# Patient Record
Sex: FEMALE | Race: BLACK OR AFRICAN AMERICAN | NOT HISPANIC OR LATINO | Employment: FULL TIME | URBAN - METROPOLITAN AREA
[De-identification: names, ages, dates, MRNs, and addresses within clinical notes are randomized per-mention and may not be internally consistent; named-entity substitution may affect disease eponyms.]

---

## 2017-02-06 ENCOUNTER — TELEPHONE (OUTPATIENT)
Dept: OBSTETRICS AND GYNECOLOGY | Facility: HOSPITAL | Age: 27
End: 2017-02-06

## 2017-02-06 NOTE — TELEPHONE ENCOUNTER
Patient has not seen me since 11/22/16 and has no showed her last 3 visits.  She has been seeing maternal fetal medicine.  I spoke with maternal-fetal medicine today and they intend for her to continue seeing us as well.  Please call the patient and have her schedule another appointment with us and stress that she needs to see both offices.

## 2017-02-14 ENCOUNTER — ROUTINE PRENATAL (OUTPATIENT)
Dept: OBSTETRICS AND GYNECOLOGY | Facility: CLINIC | Age: 27
End: 2017-02-14

## 2017-02-14 VITALS — BODY MASS INDEX: 38.92 KG/M2 | SYSTOLIC BLOOD PRESSURE: 120 MMHG | DIASTOLIC BLOOD PRESSURE: 77 MMHG | WEIGHT: 256 LBS

## 2017-02-14 DIAGNOSIS — O99.612 CONSTIPATION DURING PREGNANCY IN SECOND TRIMESTER: ICD-10-CM

## 2017-02-14 DIAGNOSIS — O09.299 HIGH RISK PREGNANCY DUE TO PREVIOUS ABORTIONS: ICD-10-CM

## 2017-02-14 DIAGNOSIS — O10.919 CHRONIC PRE-EXISTING HYPERTENSION DURING PREGNANCY: Primary | ICD-10-CM

## 2017-02-14 DIAGNOSIS — K59.00 CONSTIPATION DURING PREGNANCY IN SECOND TRIMESTER: ICD-10-CM

## 2017-02-14 DIAGNOSIS — Z13.1 SCREENING FOR DIABETES MELLITUS: ICD-10-CM

## 2017-02-14 DIAGNOSIS — Z91.199 PREGNANCY COMPLICATED BY NONCOMPLIANCE IN SECOND TRIMESTER, ANTEPARTUM: ICD-10-CM

## 2017-02-14 DIAGNOSIS — O09.892 PREGNANCY COMPLICATED BY NONCOMPLIANCE IN SECOND TRIMESTER, ANTEPARTUM: ICD-10-CM

## 2017-02-14 DIAGNOSIS — O09.292 HISTORY OF INCOMPETENT CERVIX, CURRENTLY PREGNANT IN SECOND TRIMESTER: ICD-10-CM

## 2017-02-14 PROCEDURE — 99213 OFFICE O/P EST LOW 20 MIN: CPT | Performed by: OBSTETRICS & GYNECOLOGY

## 2017-02-14 RX ORDER — ASPIRIN 81 MG
TABLET,CHEWABLE ORAL
Refills: 3 | COMMUNITY
Start: 2016-12-20 | End: 2017-08-01

## 2017-02-14 RX ORDER — DOCUSATE SODIUM 100 MG/1
100 CAPSULE, LIQUID FILLED ORAL 2 TIMES DAILY PRN
Qty: 60 CAPSULE | Refills: 5 | Status: SHIPPED | OUTPATIENT
Start: 2017-02-14 | End: 2017-05-03

## 2017-02-14 RX ORDER — SWAB
SWAB, NON-MEDICATED MISCELLANEOUS
Refills: 5 | COMMUNITY
Start: 2017-01-20 | End: 2017-08-01

## 2017-02-14 NOTE — PROGRESS NOTES
Chief complaint: Pregnancy, constipation  History present illness: Patient is here for pregnancy follow-up.  She has not seen me in about 12 weeks.  Patient has been seen maternal fetal medicine and this time.  She in the interim since her last visit has had a cerclage performed.  She is not having any problems since then.  She denies vaginal bleeding or leakage of fluid.  She does note some minor low back pain at the site of the epidural.  Patient also reports problems with constipation.  She reports that her stool is very hard and difficult to pass.  She knows it is important not to strain.  She is not taking any medication to help with constipation.  The patient is scheduled to see maternal fetal medicine later today for follow-up.  Objective: See flow sheet above  Musculoskeletal exam: Spine soft, nontender, no masses at the epidural site.  No signs of infection at the epidural site.  Assessment:  1.  26-year-old  4 para 0 at 22 and one sevenths weeks gestational age  2.  History of incompetent cervix with rescue cerclage this pregnancy  3.  Chronic hypertension during pregnancy, on medication, blood pressures well controlled  4.  Constipation  5.  Noncompliance with visits  Plan:  1.  I stressed the importance of the patient keep an off her appointments with both maternal fetal medicine and our office.  I explained that she will need to continue to see both.  The patient has some concerns about delivery at [facility].  Patient reports that she would prefer to deliver at [facility].  The patient has had multiple interactions at [facility] given her recent cerclage and her maternal fetal medicine visits.  She reports that she feels very comfortable there.  For the aspects of continuity of care, I feel it is reasonable for the patient to deliver at [facility].  She agrees with the plan.  2.  The patient will need a glucose tolerance test at her next visit.  The patient  reports that she has completed her 24-hour urine collection at the maternal-fetal medicine office.  3.  We discussed dietary modifications to help with constipation.  Increase fiber and by mouth hydration.  We will also start Colace and the patient is encouraged to take over-the-counter fiber supplements.  4.  On exam, there are no worrisome findings of the patient's epidural site.  Reassurance is offered.  Return to my office in 4 weeks.  I spent 12 out of 15 minutes with the patient in face to face counseling of the above issues.

## 2017-03-14 ENCOUNTER — RESULTS ENCOUNTER (OUTPATIENT)
Dept: OBSTETRICS AND GYNECOLOGY | Facility: CLINIC | Age: 27
End: 2017-03-14

## 2017-03-14 DIAGNOSIS — O09.299 HIGH RISK PREGNANCY DUE TO PREVIOUS ABORTIONS: ICD-10-CM

## 2017-03-14 DIAGNOSIS — Z13.1 SCREENING FOR DIABETES MELLITUS: ICD-10-CM

## 2017-03-17 LAB
EXTERNAL GTT 1 HOUR: 119
GLUCOSE 1H P 50 G GLC PO SERPL-MCNC: 119 MG/DL (ref 65–139)

## 2017-03-17 RX ORDER — PROMETHAZINE HYDROCHLORIDE 12.5 MG/1
12.5 TABLET ORAL EVERY 6 HOURS PRN
Qty: 10 TABLET | Refills: 0 | Status: SHIPPED | OUTPATIENT
Start: 2017-03-17 | End: 2017-04-03

## 2017-03-27 ENCOUNTER — ROUTINE PRENATAL (OUTPATIENT)
Dept: OBSTETRICS AND GYNECOLOGY | Facility: CLINIC | Age: 27
End: 2017-03-27

## 2017-03-27 VITALS — DIASTOLIC BLOOD PRESSURE: 88 MMHG | SYSTOLIC BLOOD PRESSURE: 130 MMHG | BODY MASS INDEX: 39.99 KG/M2 | WEIGHT: 263 LBS

## 2017-03-27 DIAGNOSIS — O09.292 HISTORY OF INCOMPETENT CERVIX, CURRENTLY PREGNANT IN SECOND TRIMESTER: ICD-10-CM

## 2017-03-27 DIAGNOSIS — O10.919 CHRONIC PRE-EXISTING HYPERTENSION DURING PREGNANCY: Primary | ICD-10-CM

## 2017-03-27 NOTE — PROGRESS NOTES
I had to leave the office for an emergency in the hospital, prior to being able to see the patient for her visit today.  We have rescheduled her appointment for tomorrow.

## 2017-03-30 ENCOUNTER — TELEPHONE (OUTPATIENT)
Dept: OBSTETRICS AND GYNECOLOGY | Facility: CLINIC | Age: 27
End: 2017-03-30

## 2017-03-30 ENCOUNTER — DOCUMENTATION (OUTPATIENT)
Dept: OBSTETRICS AND GYNECOLOGY | Facility: CLINIC | Age: 27
End: 2017-03-30

## 2017-03-30 NOTE — TELEPHONE ENCOUNTER
The patient had an appointment with me the day after I was called out, when I had no other responsibilities other than seeing patients in the office.  She chose not to come to the office that day.  If she is concerned about how how risk she is, she should've kept her appointment that day.  She has an appointment to see me next week on Monday.  I will make every attempt to be at that appointment, and she should plan to be there as well if she wishes to continue care in our office.

## 2017-03-30 NOTE — TELEPHONE ENCOUNTER
That is fine.  The patient has been frequently noncompliant with her visits with me.  It appears now she wishes to see another provider.  I cannot guarantee that maternal-fetal medicine will want to see the patient solely without a general provider.  In such case, the patient will need to make arrangements for another general OB/GYN to take care of her.  Please send the patient a termination of care letter.  We will continue to see her for emergencies for the next 30 days.  After which point time, she will need to see another OB provider.

## 2017-03-30 NOTE — PROGRESS NOTES
Patient has canceled or no showed multiple appointments during the course of this pregnancy.  She presented for her appointment on Monday 3/27/17.  Prior to me being able to see the patient, I was called out of the clinic to attend an emergency in the hospital.  The patient was given another appointment for the following day, Tuesday 3/28/17.  The patient did not show up for that appointment.  Patient is: The office today demanding to be seen immediately.  We have no appointments available today or tomorrow.  The patient was offered an appointment on Monday or Tuesday next week, but the patient initially does not want to have that appointment because I will be on-call and possibly may get called out of the office again.  If the patient is not willing to come into the office on Monday or Tuesday of next week, she will be need to be rescheduled for the following week when we have appointments available.  The patient has scheduled another appointment for Monday for/3/17.

## 2017-03-30 NOTE — TELEPHONE ENCOUNTER
I spoke to her and relayed the message.   She said she does not want to release her care she has a problem with not being able to be seen due to the Dr being called out for a delivery, going on to say that she is high risk. I reviewed her visits and it looks like she has had several no-shows and cancels and if her care was a concern, she has many of these that are unexplained. Also, if her care is important to her, she should wait for the doctor to come back from the delivery so she can have her appointment.   Are you willing to see her sometime next week when you are not on call?   She doesn't seem to think we are concerned for her and I explained to her that we cannot control if the provider is called out.  Thanks  Ankita

## 2017-03-30 NOTE — TELEPHONE ENCOUNTER
Dr. Avendaño,    Pt called asking if you will release her from your care and allow her to just see MFM only. Pt @ [phone].    Thanks,  Ankita    I leave at 4 today and will not be back in the office until 04/04/2017.

## 2017-04-03 ENCOUNTER — ROUTINE PRENATAL (OUTPATIENT)
Dept: OBSTETRICS AND GYNECOLOGY | Facility: CLINIC | Age: 27
End: 2017-04-03

## 2017-04-03 VITALS — DIASTOLIC BLOOD PRESSURE: 84 MMHG | BODY MASS INDEX: 39.84 KG/M2 | SYSTOLIC BLOOD PRESSURE: 128 MMHG | WEIGHT: 262 LBS

## 2017-04-03 DIAGNOSIS — O09.293 HISTORY OF INCOMPETENT CERVIX, CURRENTLY PREGNANT IN THIRD TRIMESTER: Primary | ICD-10-CM

## 2017-04-03 DIAGNOSIS — O10.919 CHRONIC PRE-EXISTING HYPERTENSION DURING PREGNANCY: ICD-10-CM

## 2017-04-03 PROCEDURE — 99213 OFFICE O/P EST LOW 20 MIN: CPT | Performed by: OBSTETRICS & GYNECOLOGY

## 2017-04-03 NOTE — PROGRESS NOTES
Chief complaint: Pregnancy  History present illness: Patient is without major complaints today.  She reports good fetal movement currently.  She reports that sometimes the baby is not is active during the day.  She wants the baby is always active at night.  She reports that she was having some Avinash Joseph contractions about 1-2 weeks ago, but these have resolved.  She denies vaginal bleeding.  Objective: See flow sheet above  Gen.: No acute distress, awake 3  Abdomen: Soft, no fundal tenderness, fundal height 30 cm, fetal heart tones 155  Extremities: No calf tenderness, no lower extremity edema  Labs: One-hour glucose tolerance test 119  Assessment:  1.  27-year-old  4 para 0 at 29-0/7 weeks gestational age  2.  History of cervical insufficiency, cerclage in place, stable  3.  Chronic hypertension, blood pressures stable on labetalol  4.  Obesity  Plan:  1.  The patient has been released by maternal fetal medicine.  They report that routine cervical length ultrasounds are not necessary.  They recommended removal of the cerclage at 35 weeks' gestation.  The ultrasound report reveals no anatomic abnormalities.  2.  We will have the patient return in 2 weeks.  We'll start  testing in 2 weeks secondary to chronic hypertension.  Continue oral labetalol for now.  Estimated fetal weight in 2 weeks at the time of the ultrasound.  3.  We discussed fetal movement assessments.  4.  We discussed signs of  labor.  All the patient's questions are answered.  5.  Return to the office in 2 weeks.  I spent 12 out of 15 minutes with the patient in face to face counseling of the above issues.

## 2017-04-11 ENCOUNTER — PROCEDURE VISIT (OUTPATIENT)
Dept: OBSTETRICS AND GYNECOLOGY | Facility: CLINIC | Age: 27
End: 2017-04-11

## 2017-04-11 ENCOUNTER — ROUTINE PRENATAL (OUTPATIENT)
Dept: OBSTETRICS AND GYNECOLOGY | Facility: CLINIC | Age: 27
End: 2017-04-11

## 2017-04-11 VITALS — BODY MASS INDEX: 40.6 KG/M2 | SYSTOLIC BLOOD PRESSURE: 139 MMHG | WEIGHT: 267 LBS | DIASTOLIC BLOOD PRESSURE: 87 MMHG

## 2017-04-11 DIAGNOSIS — O09.299 HIGH RISK PREGNANCY DUE TO PREVIOUS ABORTIONS: Primary | ICD-10-CM

## 2017-04-11 DIAGNOSIS — O10.919 CHRONIC PRE-EXISTING HYPERTENSION DURING PREGNANCY: ICD-10-CM

## 2017-04-11 DIAGNOSIS — O10.919 HTN IN PREGNANCY, CHRONIC: Primary | ICD-10-CM

## 2017-04-11 DIAGNOSIS — O09.293 HISTORY OF INCOMPETENT CERVIX, CURRENTLY PREGNANT IN THIRD TRIMESTER: ICD-10-CM

## 2017-04-11 PROCEDURE — 76819 FETAL BIOPHYS PROFIL W/O NST: CPT | Performed by: OBSTETRICS & GYNECOLOGY

## 2017-04-11 PROCEDURE — 76816 OB US FOLLOW-UP PER FETUS: CPT | Performed by: OBSTETRICS & GYNECOLOGY

## 2017-04-11 PROCEDURE — 99213 OFFICE O/P EST LOW 20 MIN: CPT | Performed by: OBSTETRICS & GYNECOLOGY

## 2017-04-11 NOTE — PROGRESS NOTES
Chief complaint: Pregnancy  History present illness: Patient with no major complaints today.  Here for routine visit.  Good fetal movement.  No vaginal bleeding or contractions.  Objective: See flow sheet above  Gen.: No acute distress, awake and oriented ×3  Abdomen: Soft, nontender, fetal heart tones are 161  Extremities: No lower extremity edema  Ultrasound today: Estimated fetal weight 3 lbs. 5 oz. or the 48th percentile.  Amniotic fluid index 14 cm.  Cephalic.  Assessment:  1.  27-year-old  4 para 0 at 30 and one sevenths weeks gestational age  2.  History of incompetent cervix, cerclage in place  3.  Chronic hypertension, stable on labetalol    Plan:  1.  Ultrasound findings discussed with the patient today.  Growth is appropriate.  We'll start  testing at 32 weeks secondary to chronic hypertension.  She'll see one of my partners in 2 weeks.  I will see her back in 4 weeks.  2.  Discussed plans for delivery at [facility], by patient preference  I spent 12 out of 15 minutes with the patient in face to face counseling of the above issues.

## 2017-04-26 ENCOUNTER — PROCEDURE VISIT (OUTPATIENT)
Dept: OBSTETRICS AND GYNECOLOGY | Facility: CLINIC | Age: 27
End: 2017-04-26

## 2017-04-26 ENCOUNTER — ROUTINE PRENATAL (OUTPATIENT)
Dept: OBSTETRICS AND GYNECOLOGY | Facility: CLINIC | Age: 27
End: 2017-04-26

## 2017-04-26 VITALS — SYSTOLIC BLOOD PRESSURE: 124 MMHG | DIASTOLIC BLOOD PRESSURE: 81 MMHG | WEIGHT: 269 LBS | BODY MASS INDEX: 40.9 KG/M2

## 2017-04-26 DIAGNOSIS — O09.293 HISTORY OF INCOMPETENT CERVIX, CURRENTLY PREGNANT IN THIRD TRIMESTER: ICD-10-CM

## 2017-04-26 DIAGNOSIS — O10.919 HTN IN PREGNANCY, CHRONIC: Primary | ICD-10-CM

## 2017-04-26 DIAGNOSIS — Z3A.32 32 WEEKS GESTATION OF PREGNANCY: Primary | ICD-10-CM

## 2017-04-26 DIAGNOSIS — O10.919 HTN IN PREGNANCY, CHRONIC: ICD-10-CM

## 2017-04-26 PROCEDURE — 99214 OFFICE O/P EST MOD 30 MIN: CPT | Performed by: OBSTETRICS & GYNECOLOGY

## 2017-04-26 PROCEDURE — 76819 FETAL BIOPHYS PROFIL W/O NST: CPT | Performed by: OBSTETRICS & GYNECOLOGY

## 2017-04-26 NOTE — PROGRESS NOTES
"Cc:  Pregnancy follow up  Multiple issues today.  Patient removed breast piercings early in pregnancy.  Now, patient states that the left nipple has discharge.  Right nipple with leakage.  Nipple is tender.  Further, patient also states she lost her \"mucous plug\".  Had some blood tinged bleeding/spotting.  Good FM.  No cramping or contractions.  Vitals reviewed by me.  Gen - alert and pleasant.  Abdomen - gravid, nontender, no guarding or rebound.  Breasts - nipples normal to palpation.  Cervix - closed.  Cerclage in place.  No vaginal bleeding.  BPP reviewed and score 8/8.  A/P:  IUP at 32 weeks with chronic HTN, history of cervical incompetence.  - Nipple discharge -- reassurance given.  Keep areola/nipples clean.  - cHTN -- continue labetalol and weekly BPP.  No evidence of PIH.  - Cervical incompetence - cerclage in place.  PTL warnings.  "

## 2017-05-03 ENCOUNTER — ROUTINE PRENATAL (OUTPATIENT)
Dept: OBSTETRICS AND GYNECOLOGY | Facility: CLINIC | Age: 27
End: 2017-05-03

## 2017-05-03 ENCOUNTER — PROCEDURE VISIT (OUTPATIENT)
Dept: OBSTETRICS AND GYNECOLOGY | Facility: CLINIC | Age: 27
End: 2017-05-03

## 2017-05-03 VITALS — WEIGHT: 271 LBS | DIASTOLIC BLOOD PRESSURE: 82 MMHG | SYSTOLIC BLOOD PRESSURE: 132 MMHG | BODY MASS INDEX: 41.21 KG/M2

## 2017-05-03 DIAGNOSIS — O10.919 HTN IN PREGNANCY, CHRONIC: Primary | ICD-10-CM

## 2017-05-03 DIAGNOSIS — O09.293 HISTORY OF INCOMPETENT CERVIX, CURRENTLY PREGNANT IN THIRD TRIMESTER: ICD-10-CM

## 2017-05-03 DIAGNOSIS — O10.919 CHRONIC PRE-EXISTING HYPERTENSION DURING PREGNANCY: Primary | ICD-10-CM

## 2017-05-03 PROCEDURE — 76819 FETAL BIOPHYS PROFIL W/O NST: CPT | Performed by: OBSTETRICS & GYNECOLOGY

## 2017-05-03 PROCEDURE — 99213 OFFICE O/P EST LOW 20 MIN: CPT | Performed by: OBSTETRICS & GYNECOLOGY

## 2017-05-04 ENCOUNTER — TELEPHONE (OUTPATIENT)
Dept: OBSTETRICS AND GYNECOLOGY | Facility: CLINIC | Age: 27
End: 2017-05-04

## 2017-05-10 ENCOUNTER — PROCEDURE VISIT (OUTPATIENT)
Dept: OBSTETRICS AND GYNECOLOGY | Facility: CLINIC | Age: 27
End: 2017-05-10

## 2017-05-10 DIAGNOSIS — O10.919 CHRONIC PRE-EXISTING HYPERTENSION DURING PREGNANCY: ICD-10-CM

## 2017-05-10 DIAGNOSIS — O10.919 HTN IN PREGNANCY, CHRONIC: Primary | ICD-10-CM

## 2017-05-10 PROCEDURE — 76816 OB US FOLLOW-UP PER FETUS: CPT | Performed by: OBSTETRICS & GYNECOLOGY

## 2017-05-10 PROCEDURE — 76819 FETAL BIOPHYS PROFIL W/O NST: CPT | Performed by: OBSTETRICS & GYNECOLOGY

## 2017-05-17 ENCOUNTER — ROUTINE PRENATAL (OUTPATIENT)
Dept: OBSTETRICS AND GYNECOLOGY | Facility: CLINIC | Age: 27
End: 2017-05-17

## 2017-05-17 ENCOUNTER — PROCEDURE VISIT (OUTPATIENT)
Dept: OBSTETRICS AND GYNECOLOGY | Facility: CLINIC | Age: 27
End: 2017-05-17

## 2017-05-17 VITALS — DIASTOLIC BLOOD PRESSURE: 88 MMHG | WEIGHT: 272 LBS | BODY MASS INDEX: 41.36 KG/M2 | SYSTOLIC BLOOD PRESSURE: 128 MMHG

## 2017-05-17 DIAGNOSIS — O09.299 HIGH RISK PREGNANCY DUE TO PREVIOUS ABORTIONS: ICD-10-CM

## 2017-05-17 DIAGNOSIS — O10.919 HTN IN PREGNANCY, CHRONIC: Primary | ICD-10-CM

## 2017-05-17 DIAGNOSIS — O10.919 CHRONIC PRE-EXISTING HYPERTENSION DURING PREGNANCY: ICD-10-CM

## 2017-05-17 DIAGNOSIS — O09.293 HISTORY OF INCOMPETENT CERVIX, CURRENTLY PREGNANT IN THIRD TRIMESTER: Primary | ICD-10-CM

## 2017-05-17 LAB — EXTERNAL GROUP B STREP ANTIGEN: NEGATIVE

## 2017-05-17 PROCEDURE — 76819 FETAL BIOPHYS PROFIL W/O NST: CPT | Performed by: OBSTETRICS & GYNECOLOGY

## 2017-05-17 PROCEDURE — 99214 OFFICE O/P EST MOD 30 MIN: CPT | Performed by: OBSTETRICS & GYNECOLOGY

## 2017-05-21 LAB — B-HEM STREP SPEC QL CULT: NEGATIVE

## 2017-05-24 ENCOUNTER — ROUTINE PRENATAL (OUTPATIENT)
Dept: OBSTETRICS AND GYNECOLOGY | Facility: CLINIC | Age: 27
End: 2017-05-24

## 2017-05-24 ENCOUNTER — PROCEDURE VISIT (OUTPATIENT)
Dept: OBSTETRICS AND GYNECOLOGY | Facility: CLINIC | Age: 27
End: 2017-05-24

## 2017-05-24 VITALS — SYSTOLIC BLOOD PRESSURE: 133 MMHG | WEIGHT: 276 LBS | DIASTOLIC BLOOD PRESSURE: 84 MMHG | BODY MASS INDEX: 41.97 KG/M2

## 2017-05-24 DIAGNOSIS — O09.293 HISTORY OF INCOMPETENT CERVIX, CURRENTLY PREGNANT IN THIRD TRIMESTER: ICD-10-CM

## 2017-05-24 DIAGNOSIS — O10.919 CHRONIC PRE-EXISTING HYPERTENSION DURING PREGNANCY: ICD-10-CM

## 2017-05-24 DIAGNOSIS — O09.299 HIGH RISK PREGNANCY DUE TO PREVIOUS ABORTIONS: ICD-10-CM

## 2017-05-24 DIAGNOSIS — O10.919 HTN IN PREGNANCY, CHRONIC: Primary | ICD-10-CM

## 2017-05-24 DIAGNOSIS — O10.919 CHRONIC PRE-EXISTING HYPERTENSION DURING PREGNANCY: Primary | ICD-10-CM

## 2017-05-24 PROBLEM — Z13.1 SCREENING FOR DIABETES MELLITUS: Status: RESOLVED | Noted: 2017-02-14 | Resolved: 2017-05-24

## 2017-05-24 PROCEDURE — 99214 OFFICE O/P EST MOD 30 MIN: CPT | Performed by: OBSTETRICS & GYNECOLOGY

## 2017-05-24 PROCEDURE — 76819 FETAL BIOPHYS PROFIL W/O NST: CPT | Performed by: OBSTETRICS & GYNECOLOGY

## 2017-06-02 ENCOUNTER — PROCEDURE VISIT (OUTPATIENT)
Dept: OBSTETRICS AND GYNECOLOGY | Facility: CLINIC | Age: 27
End: 2017-06-02

## 2017-06-02 ENCOUNTER — ROUTINE PRENATAL (OUTPATIENT)
Dept: OBSTETRICS AND GYNECOLOGY | Facility: CLINIC | Age: 27
End: 2017-06-02

## 2017-06-02 VITALS — BODY MASS INDEX: 42.57 KG/M2 | WEIGHT: 280 LBS | SYSTOLIC BLOOD PRESSURE: 149 MMHG | DIASTOLIC BLOOD PRESSURE: 105 MMHG

## 2017-06-02 DIAGNOSIS — O10.919 CHRONIC PRE-EXISTING HYPERTENSION DURING PREGNANCY: Primary | ICD-10-CM

## 2017-06-02 DIAGNOSIS — O09.299 HIGH RISK PREGNANCY DUE TO PREVIOUS ABORTIONS: ICD-10-CM

## 2017-06-02 DIAGNOSIS — O10.919 HTN IN PREGNANCY, CHRONIC: Primary | ICD-10-CM

## 2017-06-02 PROCEDURE — 99213 OFFICE O/P EST LOW 20 MIN: CPT | Performed by: OBSTETRICS & GYNECOLOGY

## 2017-06-02 PROCEDURE — 76819 FETAL BIOPHYS PROFIL W/O NST: CPT | Performed by: OBSTETRICS & GYNECOLOGY

## 2017-06-02 NOTE — PROGRESS NOTES
Ob follow up    Ainsley Rojas is a 27 y.o.  37w4d patient being seen today for her obstetrical visit. Patient reports no complaints. Fetal movement: normal.      ROS - Denies leaking fluid, vaginal bleeding and notes good fetal movement.     BP (!) 149/105  Wt 280 lb (127 kg)  LMP 2016 (Exact Date)  BMI 42.57 kg/m2    FHT:  156BPM    Uterine Size: size equals dates   Presentations: cephalic   Pelvic Exam:     Dilation: 2cm    Effacement: 50%    Station:  -2                 Assessment    1) Pregnancy at 37w4d  2) Fetal status reassuring   3) GBS status - negative  4) Chronic hypertension   On labetalol 200 mg BID, dose today came back up this morning  Checks BP at home - Parameters reviewed. Repeat morning dose and check BP 2-3 hours later, to triage if not better  Scheduled IND for Wednesday  BPP today  with normal JOSE CARLOS     Plan    Labor warnings   [facility] BID        Gildardo Mayr MD   2017  10:16 AM

## 2017-06-07 ENCOUNTER — OUTSIDE FACILITY SERVICE (OUTPATIENT)
Dept: OBSTETRICS AND GYNECOLOGY | Facility: CLINIC | Age: 27
End: 2017-06-07

## 2017-06-09 ENCOUNTER — OUTSIDE FACILITY SERVICE (OUTPATIENT)
Dept: OBSTETRICS AND GYNECOLOGY | Facility: CLINIC | Age: 27
End: 2017-06-09

## 2017-06-13 ENCOUNTER — OUTSIDE FACILITY SERVICE (OUTPATIENT)
Dept: OBSTETRICS AND GYNECOLOGY | Facility: CLINIC | Age: 27
End: 2017-06-13

## 2017-06-23 ENCOUNTER — TELEPHONE (OUTPATIENT)
Dept: OBSTETRICS AND GYNECOLOGY | Facility: CLINIC | Age: 27
End: 2017-06-23

## 2017-06-23 ENCOUNTER — OUTSIDE FACILITY SERVICE (OUTPATIENT)
Dept: OBSTETRICS AND GYNECOLOGY | Facility: CLINIC | Age: 27
End: 2017-06-23

## 2017-06-23 PROCEDURE — 99233 SBSQ HOSP IP/OBS HIGH 50: CPT | Performed by: OBSTETRICS & GYNECOLOGY

## 2017-06-23 NOTE — TELEPHONE ENCOUNTER
The patient apparently presented to [facility] emergency room for evaluation.  The emergency room physician called me this afternoon.  He reports that her blood pressure was in the 180s over 120s.  The patient will be admitted to [facility] by the hospitalist service for further evaluation.

## 2017-06-23 NOTE — TELEPHONE ENCOUNTER
"I called the patient back, but there was no answer.  Her phone went directly to [employer].  I left a message for the patient to call us back.  If she calls back, find out exactly what her blood pressure reading was.  Also ask the patient whether she has been taking her labetalol (blood pressure medicine).  Also, please verify the patient's phone number if she calls back.    ----- Message from Dipak Costa sent at 6/23/2017 10:54 AM EDT -----  PT called to report \"extremely high\" blood pressure that was taken at her son's doctor appointment. Wants to know if she needs to come in to be seen.    "

## 2017-06-26 ENCOUNTER — OUTSIDE FACILITY SERVICE (OUTPATIENT)
Dept: OBSTETRICS AND GYNECOLOGY | Facility: CLINIC | Age: 27
End: 2017-06-26

## 2017-06-26 PROCEDURE — 99231 SBSQ HOSP IP/OBS SF/LOW 25: CPT | Performed by: OBSTETRICS & GYNECOLOGY

## 2017-07-20 ENCOUNTER — TELEPHONE (OUTPATIENT)
Dept: OBSTETRICS AND GYNECOLOGY | Facility: CLINIC | Age: 27
End: 2017-07-20

## 2017-07-20 NOTE — TELEPHONE ENCOUNTER
Called pt about no-show on 7/19/17. She states the baby was not feeling well and she forgot to cancel the appointment.  Pt aware of no- show policy.  Pt rescheduled.raquel

## 2017-08-01 ENCOUNTER — POSTPARTUM VISIT (OUTPATIENT)
Dept: OBSTETRICS AND GYNECOLOGY | Facility: CLINIC | Age: 27
End: 2017-08-01

## 2017-08-01 ENCOUNTER — TELEPHONE (OUTPATIENT)
Dept: OBSTETRICS AND GYNECOLOGY | Facility: CLINIC | Age: 27
End: 2017-08-01

## 2017-08-01 VITALS
HEART RATE: 76 BPM | WEIGHT: 252 LBS | SYSTOLIC BLOOD PRESSURE: 189 MMHG | HEIGHT: 69 IN | DIASTOLIC BLOOD PRESSURE: 145 MMHG | BODY MASS INDEX: 37.33 KG/M2

## 2017-08-01 DIAGNOSIS — I16.0 HYPERTENSIVE URGENCY: ICD-10-CM

## 2017-08-01 RX ORDER — ACETAMINOPHEN 325 MG/1
TABLET ORAL
Refills: 0 | COMMUNITY
Start: 2017-06-25

## 2017-08-01 RX ORDER — SULFAMETHOXAZOLE AND TRIMETHOPRIM 800; 160 MG/1; MG/1
TABLET ORAL
Refills: 6 | COMMUNITY
Start: 2017-07-11

## 2017-08-01 RX ORDER — ERYTHROMYCIN 20 MG/ML
SOLUTION TOPICAL
Refills: 8 | COMMUNITY
Start: 2017-07-11

## 2017-08-01 RX ORDER — CLINDAMYCIN PHOSPHATE 11.9 MG/ML
SOLUTION TOPICAL
Refills: 6 | COMMUNITY
Start: 2017-06-10

## 2017-08-01 RX ORDER — FLUTICASONE PROPIONATE 50 MCG
2 SPRAY, SUSPENSION (ML) NASAL
COMMUNITY
Start: 2017-06-25

## 2017-08-01 RX ORDER — LABETALOL 300 MG/1
TABLET, FILM COATED ORAL
Refills: 0 | COMMUNITY
Start: 2017-06-25

## 2017-08-01 RX ORDER — BENZOYL PEROXIDE 50 MG/ML
LIQUID TOPICAL
Refills: 8 | COMMUNITY
Start: 2017-07-11

## 2017-08-01 NOTE — PROGRESS NOTES
"Subjective   Ainsley Rojas is a 27 y.o. female   Pt here for 6wk pp. Pt does not want bc at this time.  History of Present Illness  Pt here for 6wk pp. Pt does not want bc at this time.  Patient eloped from the office prior to being seen by the physician.    Review of Systems    Objective   Physical Exam  Vitals:    08/01/17 1425 08/01/17 1451   BP: (!) 200/131 (!) 189/145   Pulse: 71 76   Weight: 252 lb (114 kg)    Height: 69\" (175.3 cm)    Patient eloped from the office prior to being seen by the physician.  The patient reported to the nursing staff that she has not taken her blood pressure medicine today.  She reports that she has been in the emergency room for the last 3 days for evaluation of her blood pressure.    Assessment/Plan   Diagnoses and all orders for this visit:    Encounter for postpartum visit    Hypertensive urgency       The patient eloped from the office without notifying of our staff prior to me being able to see her.  The patient has had several no-show appointments and has been noncompliant with care.  I believe that we will no longer be able to offer care to the patient.  We will try to contact the patient and advised her to see her primary care physician urgently for evaluation of her elevated blood pressure.             "

## 2017-08-01 NOTE — TELEPHONE ENCOUNTER
Please send a discontinuation of care notice to the patient due to her frequent no-show visits and noncompliance with care, including eloping from office today at the time of her visit.

## 2017-08-01 NOTE — TELEPHONE ENCOUNTER
Please call the patient and tell her that after she left the office without notifying any of her staff, I saw how high her blood pressure was.  Please tell her that I recommend that she make an appointment as soon as possible with her primary care physician for management of her elevated blood pressure.  If she begins to have concerning symptoms such as headache, chest pain, shortness of breath, dizziness, neurologic deficits, she will need to go to the emergency room.

## 2017-08-03 NOTE — TELEPHONE ENCOUNTER
"Pt called in because she saw the dismissal letter in her chart on [facility]  And was questioning why she was being dismissed.  She claims that her last 2 no-show appointments where canceled by our office. I told her that there was not record of anyone canceling her appointments. She states this is the second time she had been threatened with dismissal due to no-shows and these last 2 where not her fault. I advised that I did see several no-shows to our office while she was pregnant ,and the message stating that if she was not compliant with her appointments she would be dismissed.  I also let her know that she left the office without letting anyone know on 8/1/17 and all of these together are the reason for her dismissal. She states she told \"everyone that was in the [place]\" that she was leaving on 8/1/17. I advised her that she should have stopped at the  to let them or Meena know she was leaving and to see if  was able to see her or if she need to reschedule.  She stated she did not have time to do that because her child had an appointment that she was going to be late for. She says she will be calling passport and a .  Jackie  "

## 2017-08-03 NOTE — TELEPHONE ENCOUNTER
I'm sorry for Ms. Rojas' dissatisfaction; however, I find her frequent noncompliance to be too difficult a barrier to overcome in terms of her care.  I'm not certain which visits the patient is referring to, as she has had multiple no-show appointments in our office.  In my opinion, there is never any justification for eloping from an office, especially when there are medical issues to address such as her hypertensive urgency.  The patient did not show up for her appointment on July 19 following her hospitalization, and she was not admitted to the hospital at that time under our care.  During her pregnancy, the patient had several no-show appointments including 6/5, 3/28, 3/20, 3/10, 1/10, 12/30/16, and 12/6/16.  Additionally, the patient had difficulty with compliance with completing a 24-hour urine in Dr. Reilly' office, as noted in his report from 1/17/17. He also had concerns about her lack of followup in our office, as stated in his note from 12/28/16.     It appears that Ms Rojas has a pattern of sporadic care, and I do not feel that we can continue to offer her care in our practice going forward.